# Patient Record
Sex: MALE | Race: BLACK OR AFRICAN AMERICAN | NOT HISPANIC OR LATINO | URBAN - METROPOLITAN AREA
[De-identification: names, ages, dates, MRNs, and addresses within clinical notes are randomized per-mention and may not be internally consistent; named-entity substitution may affect disease eponyms.]

---

## 2017-05-01 ENCOUNTER — EMERGENCY (EMERGENCY)
Facility: HOSPITAL | Age: 22
LOS: 1 days | Discharge: HOME | End: 2017-05-01
Attending: EMERGENCY MEDICINE | Admitting: EMERGENCY MEDICINE
Payer: MEDICAID

## 2017-05-01 VITALS
TEMPERATURE: 99 F | DIASTOLIC BLOOD PRESSURE: 72 MMHG | OXYGEN SATURATION: 100 % | HEART RATE: 92 BPM | SYSTOLIC BLOOD PRESSURE: 176 MMHG | RESPIRATION RATE: 16 BRPM

## 2017-05-01 VITALS
RESPIRATION RATE: 18 BRPM | DIASTOLIC BLOOD PRESSURE: 59 MMHG | HEART RATE: 72 BPM | SYSTOLIC BLOOD PRESSURE: 103 MMHG | OXYGEN SATURATION: 100 %

## 2017-05-01 DIAGNOSIS — M25.512 PAIN IN LEFT SHOULDER: ICD-10-CM

## 2017-05-01 DIAGNOSIS — M24.412 RECURRENT DISLOCATION, LEFT SHOULDER: ICD-10-CM

## 2017-05-01 PROCEDURE — 99284 EMERGENCY DEPT VISIT MOD MDM: CPT | Mod: 25

## 2017-05-01 PROCEDURE — 73030 X-RAY EXAM OF SHOULDER: CPT | Mod: 26,LT

## 2017-05-01 PROCEDURE — 99152 MOD SED SAME PHYS/QHP 5/>YRS: CPT

## 2017-05-01 RX ORDER — KETOROLAC TROMETHAMINE 30 MG/ML
60 SYRINGE (ML) INJECTION ONCE
Qty: 0 | Refills: 0 | Status: DISCONTINUED | OUTPATIENT
Start: 2017-05-01 | End: 2017-05-01

## 2017-05-01 RX ORDER — IBUPROFEN 200 MG
1 TABLET ORAL
Qty: 30 | Refills: 0 | OUTPATIENT
Start: 2017-05-01

## 2017-05-01 RX ADMIN — PROPOFOL 100 MILLIGRAM(S): 10 INJECTION, EMULSION INTRAVENOUS at 21:02

## 2017-05-01 RX ADMIN — Medication 60 MILLIGRAM(S): at 18:55

## 2017-05-01 RX ADMIN — FENTANYL CITRATE 50 MICROGRAM(S): 50 INJECTION INTRAVENOUS at 21:01

## 2017-05-01 NOTE — ED PROCEDURE NOTE - PROCEDURE ADDITIONAL DETAILS
Given 20 cc's of 2% lidocaine. Intra-articular injection 20 cc's of 2% lidocaine. Prepped with chlorhexidine. Intra-articular injection 20 cc's of 2% lidocaine. Prepped with chlorhexidine.    Procedural sedation used subsequently and shoulder reduced successfully with Dr. Russell of [facility] using ext rotation.

## 2017-05-01 NOTE — ED PROCEDURE NOTE - CPROC ED POST PROC CARE GUIDE1
Keep the cast/splint/dressing clean and dry./Elevate the injured extremity as instructed./Instructed patient/caregiver to follow-up with primary care physician./Verbal/written post procedure instructions were given to patient/caregiver.
Verbal/written post procedure instructions were given to patient/caregiver.

## 2017-05-01 NOTE — ED PROVIDER NOTE - OBJECTIVE STATEMENT
21 year old male with a PMH of 6 shoulder dislocations presents with L shoulder pain today. Patient was playing basketball and was reaching for the ball with his L arm when another player pulled his L arm back. Denies head injuries, no LOC. 21 year old male with a PMH of 6 L shoulder dislocations presents with L shoulder pain today. Patient was playing basketball this evening when someone went to get ball from him. His hand was in an overhead position when another player wrenched the ball from him and in the process dislocated his shoulder. Denies any other injuries, no head trauma, no LOC. He also notes slight posterior deltoid numbness. Last L shoulder dislocation was 2 weeks ago, patient usually requires sedation, never followed up with orthopedics.

## 2017-05-01 NOTE — ED PROVIDER NOTE - CONSTITUTIONAL, MLM
normal... Well appearing, well nourished, awake, alert, oriented to person, place, time/situation and in no apparent distress. Moderate distress, particularly when he moves. awake, alert, oriented to person, place, time/situation and in no apparent distress.

## 2017-05-01 NOTE — ED PROCEDURE NOTE - NS ED PERI VASCULAR NEG
no paresthesia/no cyanosis of extremity/capillary refill time < 2 seconds/fingers/toes warm to touch no cyanosis of extremity/slight tingling at post deltoid/fingers/toes warm to touch/capillary refill time < 2 seconds

## 2017-05-01 NOTE — ED ADULT NURSE NOTE - OBJECTIVE STATEMENT
c/o pain to left shoulder with obvious deformity, occurred while playing basketball  > + hx of similar most recently dislocated 2xweeks PTA. +neurovascularly intact. Pt taken directly to Xray, medicated for pain, will f.u as indicated.

## 2017-05-01 NOTE — ED PROCEDURE NOTE - PROCEDURAL SEDATION DETAILS
IV access was obtained./End tidal CO2 was monitored./There was continuous monitoring of patient's oxygen saturation, respiratory rate, heart rate, blood pressure, level of consciousness, and physiological status./Oxygen therapy was applied.

## 2017-05-01 NOTE — ED PROVIDER NOTE - MUSCULOSKELETAL, MLM
Spine appears normal, range of motion is not limited, no muscle or joint tenderness L shoulder is squared off, consistent with an anterior shoulder dislocation, limited ROM secondary to dislocation and pain.

## 2017-05-01 NOTE — ED PROVIDER NOTE - NS ED MD SCRIBE ATTENDING SCRIBE SECTIONS
PHYSICAL EXAM/HISTORY OF PRESENT ILLNESS/DISPOSITION/PROGRESS NOTE/RESULTS/INTAKE ASSESSMENT/SCREENINGS/OBSERVATION MONITORING PLAN/REVIEW OF SYSTEMS/PAST MEDICAL/SURGICAL/SOCIAL HISTORY/VITAL SIGNS( Pullset)/CONSULTATIONS/SHIFT CHANGE/HIV

## 2017-05-01 NOTE — ED PROVIDER NOTE - PROGRESS NOTE DETAILS
Shoulder reduced with ortho. Afterwards radiology encountered patient in the resus room performing oral sex on his GFD (they were in a "69"). Patient then up at desk asking for d/c. In shoulder immobilizer. Will d/c. Needs ortho fu. Shoulder reduced with ortho. No longer has numbness in deltoid region. Post reduction films OK. patient is fully awake. Patient then up at desk asking for d/c. In shoulder immobilizer. Will d/c. Needs ortho fu.

## 2017-05-01 NOTE — ED PROVIDER NOTE - MEDICAL DECISION MAKING DETAILS
Patient with dislocated L shoulder. Give pain medication and muscle relaxant. 20 cc's of 2% lidocaine given intraarticularly and will attempt manual reduction. Patient with dislocated L shoulder. Give pain medication and muscle relaxant. 20 cc's of 2% lidocaine given intra-articularly and will attempt manual reduction.

## 2017-05-03 RX ORDER — PROPOFOL 10 MG/ML
100 INJECTION, EMULSION INTRAVENOUS ONCE
Qty: 0 | Refills: 0 | Status: COMPLETED | OUTPATIENT
Start: 2017-05-03 | End: 2017-05-01

## 2017-05-03 RX ORDER — FENTANYL CITRATE 50 UG/ML
50 INJECTION INTRAVENOUS ONCE
Qty: 0 | Refills: 0 | Status: DISCONTINUED | OUTPATIENT
Start: 2017-05-03 | End: 2017-05-01

## 2019-01-01 NOTE — ED ADULT NURSE NOTE - EENT WDL
Regular rate and rhythm, Heart sounds S1 S2 present, no murmurs, rubs or gallops
Eyes with no visual disturbances.  Ears clean and dry and no hearing difficulties. Nose with pink mucosa and no drainage.  Mouth mucous membranes moist and pink.  No tenderness or swelling to throat or neck.

## 2023-04-07 NOTE — ED ADULT NURSE NOTE - TETANUS
SURGERY OFFICE Visit Note      4/7/2023    I have reviewed the nurse’s notes and assessment and agree.    HISTORY OF PRESENT ILLNESS    José Kelley is a 53 year old year old male who presents for follow up after excision of cyst on back. He is doing well and has no complaints. He has resumed normal activities and is back to work    PAST MEDICAL HISTORY      Essential (primary) hypertension                              High cholesterol                                              PAST SURGICAL HISTORY    Excision of back cyst  PHYSICAL EXAMINATION    Vitals signs:  There were no vitals taken for this visit.  Constitutional:  Patient was alert and appropriate. No severe distress.    Respiratory:  Normal effort without use of accessory muscles. Clear breath sounds bilaterally.    Cardiac:  Normal rate. Normal rhythm. No murmurs, gallops, or rubs.  Abdomen:  Soft, NTTP  Skin: back incision with sutures in place. Skin well approximated. No erythema or drainage. Sutures removed and steri strips applied.      Studies    Cyst, mid upper back; excision:   -Benign fibroadipose tissue with acute and chronic inflammation, fibrosis and fat necrosis  -Findings are compatible with ruptured cyst reaction  -Negative for malignancy    impression    54 YO M s/p excision sebaceous cyst of the back  Pathology negative for malignancy  Doing well  Still has sutures  Back to work  No limitations   No complaints    recommendations    Wound clean  Sutures removed-steri strips applied  No local wound care  Path negative  Resume normal activity  No plans for follow up     Danielle Valentino DNP NP-BC  General Surgery [facility]    
<5 years